# Patient Record
Sex: FEMALE | Race: WHITE | NOT HISPANIC OR LATINO | URBAN - METROPOLITAN AREA
[De-identification: names, ages, dates, MRNs, and addresses within clinical notes are randomized per-mention and may not be internally consistent; named-entity substitution may affect disease eponyms.]

---

## 2017-01-29 ENCOUNTER — OUTPATIENT (OUTPATIENT)
Dept: OUTPATIENT SERVICES | Facility: HOSPITAL | Age: 82
LOS: 1 days | End: 2017-01-29

## 2017-01-29 ENCOUNTER — INPATIENT (INPATIENT)
Facility: HOSPITAL | Age: 82
LOS: 4 days | Discharge: HOME HEALTH | End: 2017-02-03
Payer: MEDICARE

## 2017-01-29 PROCEDURE — 99285 EMERGENCY DEPT VISIT HI MDM: CPT

## 2017-01-29 PROCEDURE — 71020: CPT | Mod: 26

## 2017-01-30 ENCOUNTER — OUTPATIENT (OUTPATIENT)
Dept: OUTPATIENT SERVICES | Facility: HOSPITAL | Age: 82
LOS: 1 days | End: 2017-01-30

## 2017-01-30 PROCEDURE — 71250 CT THORAX DX C-: CPT | Mod: 26

## 2017-01-31 ENCOUNTER — OUTPATIENT (OUTPATIENT)
Dept: OUTPATIENT SERVICES | Facility: HOSPITAL | Age: 82
LOS: 1 days | End: 2017-01-31

## 2017-02-01 ENCOUNTER — OUTPATIENT (OUTPATIENT)
Dept: OUTPATIENT SERVICES | Facility: HOSPITAL | Age: 82
LOS: 1 days | End: 2017-02-01

## 2021-07-16 ENCOUNTER — EMERGENCY (EMERGENCY)
Facility: HOSPITAL | Age: 86
LOS: 1 days | End: 2021-07-16
Admitting: EMERGENCY MEDICINE
Payer: MEDICARE

## 2021-07-16 PROCEDURE — 93010 ELECTROCARDIOGRAM REPORT: CPT

## 2021-07-16 PROCEDURE — 99285 EMERGENCY DEPT VISIT HI MDM: CPT

## 2021-07-16 PROCEDURE — 71045 X-RAY EXAM CHEST 1 VIEW: CPT | Mod: 26

## 2021-09-10 ENCOUNTER — OUTPATIENT (OUTPATIENT)
Dept: OUTPATIENT SERVICES | Facility: HOSPITAL | Age: 86
LOS: 1 days | End: 2021-09-10
Payer: MEDICARE

## 2021-09-10 PROCEDURE — 93925 LOWER EXTREMITY STUDY: CPT | Mod: 26

## 2021-09-10 PROCEDURE — 93970 EXTREMITY STUDY: CPT | Mod: 26

## 2022-03-31 ENCOUNTER — APPOINTMENT (OUTPATIENT)
Dept: OPHTHALMOLOGY | Facility: CLINIC | Age: 87
End: 2022-03-31
Payer: MEDICARE

## 2022-03-31 ENCOUNTER — NON-APPOINTMENT (OUTPATIENT)
Age: 87
End: 2022-03-31

## 2022-03-31 PROCEDURE — 99213 OFFICE O/P EST LOW 20 MIN: CPT

## 2022-04-04 ENCOUNTER — APPOINTMENT (OUTPATIENT)
Dept: OPHTHALMOLOGY | Facility: CLINIC | Age: 87
End: 2022-04-04
Payer: MEDICARE

## 2022-04-04 ENCOUNTER — NON-APPOINTMENT (OUTPATIENT)
Age: 87
End: 2022-04-04

## 2022-04-04 PROCEDURE — 99213 OFFICE O/P EST LOW 20 MIN: CPT

## 2022-04-11 ENCOUNTER — APPOINTMENT (OUTPATIENT)
Dept: OPHTHALMOLOGY | Facility: CLINIC | Age: 87
End: 2022-04-11
Payer: MEDICARE

## 2022-04-11 ENCOUNTER — NON-APPOINTMENT (OUTPATIENT)
Age: 87
End: 2022-04-11

## 2022-04-11 PROCEDURE — 99213 OFFICE O/P EST LOW 20 MIN: CPT

## 2022-04-28 ENCOUNTER — NON-APPOINTMENT (OUTPATIENT)
Age: 87
End: 2022-04-28

## 2022-04-28 ENCOUNTER — APPOINTMENT (OUTPATIENT)
Dept: OPHTHALMOLOGY | Facility: CLINIC | Age: 87
End: 2022-04-28
Payer: MEDICARE

## 2022-04-28 PROCEDURE — 92134 CPTRZ OPH DX IMG PST SGM RTA: CPT

## 2022-04-28 PROCEDURE — 92014 COMPRE OPH EXAM EST PT 1/>: CPT

## 2022-05-05 ENCOUNTER — APPOINTMENT (OUTPATIENT)
Dept: ULTRASOUND IMAGING | Facility: CLINIC | Age: 87
End: 2022-05-05
Payer: MEDICARE

## 2022-05-05 PROCEDURE — 93970 EXTREMITY STUDY: CPT

## 2022-07-21 ENCOUNTER — APPOINTMENT (OUTPATIENT)
Dept: OPHTHALMOLOGY | Facility: CLINIC | Age: 87
End: 2022-07-21

## 2022-07-21 ENCOUNTER — NON-APPOINTMENT (OUTPATIENT)
Age: 87
End: 2022-07-21

## 2022-07-21 PROCEDURE — 99213 OFFICE O/P EST LOW 20 MIN: CPT

## 2022-08-03 ENCOUNTER — APPOINTMENT (OUTPATIENT)
Dept: OPHTHALMOLOGY | Facility: CLINIC | Age: 87
End: 2022-08-03

## 2022-08-03 ENCOUNTER — NON-APPOINTMENT (OUTPATIENT)
Age: 87
End: 2022-08-03

## 2022-08-03 PROCEDURE — 99213 OFFICE O/P EST LOW 20 MIN: CPT

## 2022-08-18 ENCOUNTER — APPOINTMENT (OUTPATIENT)
Dept: OPHTHALMOLOGY | Facility: CLINIC | Age: 87
End: 2022-08-18

## 2022-08-18 ENCOUNTER — NON-APPOINTMENT (OUTPATIENT)
Age: 87
End: 2022-08-18

## 2022-08-18 PROCEDURE — 92014 COMPRE OPH EXAM EST PT 1/>: CPT

## 2022-08-18 PROCEDURE — 92250 FUNDUS PHOTOGRAPHY W/I&R: CPT

## 2022-08-19 ENCOUNTER — NON-APPOINTMENT (OUTPATIENT)
Age: 87
End: 2022-08-19

## 2022-08-19 ENCOUNTER — APPOINTMENT (OUTPATIENT)
Dept: OPHTHALMOLOGY | Facility: CLINIC | Age: 87
End: 2022-08-19

## 2022-08-19 PROCEDURE — 92133 CPTRZD OPH DX IMG PST SGM ON: CPT

## 2022-08-19 PROCEDURE — 92083 EXTENDED VISUAL FIELD XM: CPT

## 2022-08-25 ENCOUNTER — APPOINTMENT (OUTPATIENT)
Dept: OPHTHALMOLOGY | Facility: CLINIC | Age: 87
End: 2022-08-25

## 2022-08-25 ENCOUNTER — NON-APPOINTMENT (OUTPATIENT)
Age: 87
End: 2022-08-25

## 2022-08-25 PROCEDURE — 99213 OFFICE O/P EST LOW 20 MIN: CPT

## 2022-08-29 ENCOUNTER — APPOINTMENT (OUTPATIENT)
Dept: OPHTHALMOLOGY | Facility: CLINIC | Age: 87
End: 2022-08-29

## 2022-08-29 ENCOUNTER — NON-APPOINTMENT (OUTPATIENT)
Age: 87
End: 2022-08-29

## 2022-08-29 PROCEDURE — 99213 OFFICE O/P EST LOW 20 MIN: CPT

## 2022-09-01 ENCOUNTER — NON-APPOINTMENT (OUTPATIENT)
Age: 87
End: 2022-09-01

## 2022-09-01 ENCOUNTER — APPOINTMENT (OUTPATIENT)
Dept: OPHTHALMOLOGY | Facility: CLINIC | Age: 87
End: 2022-09-01

## 2022-09-01 PROCEDURE — 99213 OFFICE O/P EST LOW 20 MIN: CPT

## 2022-09-12 ENCOUNTER — NON-APPOINTMENT (OUTPATIENT)
Age: 87
End: 2022-09-12

## 2022-09-12 ENCOUNTER — APPOINTMENT (OUTPATIENT)
Dept: OPHTHALMOLOGY | Facility: CLINIC | Age: 87
End: 2022-09-12

## 2022-09-12 PROCEDURE — 92012 INTRM OPH EXAM EST PATIENT: CPT

## 2022-10-13 ENCOUNTER — APPOINTMENT (OUTPATIENT)
Dept: DERMATOLOGY | Facility: CLINIC | Age: 87
End: 2022-10-13

## 2022-10-13 PROCEDURE — 99203 OFFICE O/P NEW LOW 30 MIN: CPT

## 2022-10-31 ENCOUNTER — APPOINTMENT (OUTPATIENT)
Dept: OPHTHALMOLOGY | Facility: CLINIC | Age: 87
End: 2022-10-31

## 2022-10-31 ENCOUNTER — NON-APPOINTMENT (OUTPATIENT)
Age: 87
End: 2022-10-31

## 2022-10-31 PROCEDURE — 92012 INTRM OPH EXAM EST PATIENT: CPT

## 2023-02-23 ENCOUNTER — EMERGENCY (EMERGENCY)
Facility: HOSPITAL | Age: 88
LOS: 1 days | Discharge: HOME | End: 2023-02-23
Attending: EMERGENCY MEDICINE | Admitting: EMERGENCY MEDICINE
Payer: MEDICARE

## 2023-02-23 VITALS
OXYGEN SATURATION: 99 % | DIASTOLIC BLOOD PRESSURE: 69 MMHG | HEART RATE: 70 BPM | RESPIRATION RATE: 15 BRPM | SYSTOLIC BLOOD PRESSURE: 160 MMHG

## 2023-02-23 VITALS
HEIGHT: 62 IN | SYSTOLIC BLOOD PRESSURE: 202 MMHG | HEART RATE: 66 BPM | DIASTOLIC BLOOD PRESSURE: 74 MMHG | OXYGEN SATURATION: 96 % | TEMPERATURE: 98 F | WEIGHT: 110.01 LBS | RESPIRATION RATE: 16 BRPM

## 2023-02-23 LAB
ALBUMIN SERPL ELPH-MCNC: 3.1 G/DL — LOW (ref 3.3–5)
ALP SERPL-CCNC: 64 U/L — SIGNIFICANT CHANGE UP (ref 30–120)
ALT FLD-CCNC: 24 U/L DA — SIGNIFICANT CHANGE UP (ref 10–60)
ANION GAP SERPL CALC-SCNC: 6 MMOL/L — SIGNIFICANT CHANGE UP (ref 5–17)
APPEARANCE UR: CLEAR — SIGNIFICANT CHANGE UP
AST SERPL-CCNC: 22 U/L — SIGNIFICANT CHANGE UP (ref 10–40)
BASOPHILS # BLD AUTO: 0.04 K/UL — SIGNIFICANT CHANGE UP (ref 0–0.2)
BASOPHILS NFR BLD AUTO: 0.5 % — SIGNIFICANT CHANGE UP (ref 0–2)
BILIRUB SERPL-MCNC: 0.5 MG/DL — SIGNIFICANT CHANGE UP (ref 0.2–1.2)
BILIRUB UR-MCNC: NEGATIVE — SIGNIFICANT CHANGE UP
BUN SERPL-MCNC: 15 MG/DL — SIGNIFICANT CHANGE UP (ref 7–23)
CALCIUM SERPL-MCNC: 9.4 MG/DL — SIGNIFICANT CHANGE UP (ref 8.4–10.5)
CHLORIDE SERPL-SCNC: 108 MMOL/L — SIGNIFICANT CHANGE UP (ref 96–108)
CO2 SERPL-SCNC: 27 MMOL/L — SIGNIFICANT CHANGE UP (ref 22–31)
COLOR SPEC: YELLOW — SIGNIFICANT CHANGE UP
CREAT SERPL-MCNC: 0.83 MG/DL — SIGNIFICANT CHANGE UP (ref 0.5–1.3)
DIFF PNL FLD: NEGATIVE — SIGNIFICANT CHANGE UP
EGFR: 68 ML/MIN/1.73M2 — SIGNIFICANT CHANGE UP
EOSINOPHIL # BLD AUTO: 0.14 K/UL — SIGNIFICANT CHANGE UP (ref 0–0.5)
EOSINOPHIL NFR BLD AUTO: 1.9 % — SIGNIFICANT CHANGE UP (ref 0–6)
FLUAV AG NPH QL: SIGNIFICANT CHANGE UP
FLUBV AG NPH QL: SIGNIFICANT CHANGE UP
GLUCOSE SERPL-MCNC: 98 MG/DL — SIGNIFICANT CHANGE UP (ref 70–99)
GLUCOSE UR QL: NEGATIVE MG/DL — SIGNIFICANT CHANGE UP
HCT VFR BLD CALC: 38.1 % — SIGNIFICANT CHANGE UP (ref 34.5–45)
HGB BLD-MCNC: 12.2 G/DL — SIGNIFICANT CHANGE UP (ref 11.5–15.5)
IMM GRANULOCYTES NFR BLD AUTO: 0.4 % — SIGNIFICANT CHANGE UP (ref 0–0.9)
KETONES UR-MCNC: NEGATIVE — SIGNIFICANT CHANGE UP
LEUKOCYTE ESTERASE UR-ACNC: NEGATIVE — SIGNIFICANT CHANGE UP
LYMPHOCYTES # BLD AUTO: 1.61 K/UL — SIGNIFICANT CHANGE UP (ref 1–3.3)
LYMPHOCYTES # BLD AUTO: 21.7 % — SIGNIFICANT CHANGE UP (ref 13–44)
MCHC RBC-ENTMCNC: 32 GM/DL — SIGNIFICANT CHANGE UP (ref 32–36)
MCHC RBC-ENTMCNC: 32.1 PG — SIGNIFICANT CHANGE UP (ref 27–34)
MCV RBC AUTO: 100.3 FL — HIGH (ref 80–100)
MONOCYTES # BLD AUTO: 0.81 K/UL — SIGNIFICANT CHANGE UP (ref 0–0.9)
MONOCYTES NFR BLD AUTO: 10.9 % — SIGNIFICANT CHANGE UP (ref 2–14)
NEUTROPHILS # BLD AUTO: 4.8 K/UL — SIGNIFICANT CHANGE UP (ref 1.8–7.4)
NEUTROPHILS NFR BLD AUTO: 64.6 % — SIGNIFICANT CHANGE UP (ref 43–77)
NITRITE UR-MCNC: NEGATIVE — SIGNIFICANT CHANGE UP
NRBC # BLD: 0 /100 WBCS — SIGNIFICANT CHANGE UP (ref 0–0)
PH UR: 6 — SIGNIFICANT CHANGE UP (ref 5–8)
PLATELET # BLD AUTO: 255 K/UL — SIGNIFICANT CHANGE UP (ref 150–400)
POTASSIUM SERPL-MCNC: 5.7 MMOL/L — HIGH (ref 3.5–5.3)
POTASSIUM SERPL-SCNC: 5.7 MMOL/L — HIGH (ref 3.5–5.3)
PROT SERPL-MCNC: 7 G/DL — SIGNIFICANT CHANGE UP (ref 6–8.3)
PROT UR-MCNC: NEGATIVE MG/DL — SIGNIFICANT CHANGE UP
RBC # BLD: 3.8 M/UL — SIGNIFICANT CHANGE UP (ref 3.8–5.2)
RBC # FLD: 13.1 % — SIGNIFICANT CHANGE UP (ref 10.3–14.5)
RSV RNA NPH QL NAA+NON-PROBE: SIGNIFICANT CHANGE UP
SARS-COV-2 RNA SPEC QL NAA+PROBE: SIGNIFICANT CHANGE UP
SODIUM SERPL-SCNC: 141 MMOL/L — SIGNIFICANT CHANGE UP (ref 135–145)
SP GR SPEC: 1.01 — SIGNIFICANT CHANGE UP (ref 1.01–1.02)
TROPONIN I, HIGH SENSITIVITY RESULT: 8.9 NG/L — SIGNIFICANT CHANGE UP
UROBILINOGEN FLD QL: NEGATIVE MG/DL — SIGNIFICANT CHANGE UP
WBC # BLD: 7.43 K/UL — SIGNIFICANT CHANGE UP (ref 3.8–10.5)
WBC # FLD AUTO: 7.43 K/UL — SIGNIFICANT CHANGE UP (ref 3.8–10.5)

## 2023-02-23 PROCEDURE — 71045 X-RAY EXAM CHEST 1 VIEW: CPT | Mod: 26

## 2023-02-23 PROCEDURE — 70450 CT HEAD/BRAIN W/O DYE: CPT | Mod: MA

## 2023-02-23 PROCEDURE — 81003 URINALYSIS AUTO W/O SCOPE: CPT

## 2023-02-23 PROCEDURE — 80053 COMPREHEN METABOLIC PANEL: CPT

## 2023-02-23 PROCEDURE — 87637 SARSCOV2&INF A&B&RSV AMP PRB: CPT

## 2023-02-23 PROCEDURE — 93005 ELECTROCARDIOGRAM TRACING: CPT

## 2023-02-23 PROCEDURE — 99285 EMERGENCY DEPT VISIT HI MDM: CPT | Mod: FS

## 2023-02-23 PROCEDURE — 85025 COMPLETE CBC W/AUTO DIFF WBC: CPT

## 2023-02-23 PROCEDURE — 70450 CT HEAD/BRAIN W/O DYE: CPT | Mod: 26,MA

## 2023-02-23 PROCEDURE — 93010 ELECTROCARDIOGRAM REPORT: CPT

## 2023-02-23 PROCEDURE — 99285 EMERGENCY DEPT VISIT HI MDM: CPT | Mod: 25

## 2023-02-23 PROCEDURE — 84484 ASSAY OF TROPONIN QUANT: CPT

## 2023-02-23 PROCEDURE — 36415 COLL VENOUS BLD VENIPUNCTURE: CPT

## 2023-02-23 PROCEDURE — 71045 X-RAY EXAM CHEST 1 VIEW: CPT

## 2023-02-23 PROCEDURE — 96360 HYDRATION IV INFUSION INIT: CPT

## 2023-02-23 RX ORDER — ASPIRIN/CALCIUM CARB/MAGNESIUM 324 MG
1 TABLET ORAL
Qty: 0 | Refills: 0 | DISCHARGE

## 2023-02-23 RX ORDER — METOPROLOL TARTRATE 50 MG
1 TABLET ORAL
Qty: 0 | Refills: 0 | DISCHARGE

## 2023-02-23 RX ORDER — SODIUM CHLORIDE 9 MG/ML
1000 INJECTION INTRAMUSCULAR; INTRAVENOUS; SUBCUTANEOUS ONCE
Refills: 0 | Status: COMPLETED | OUTPATIENT
Start: 2023-02-23 | End: 2023-02-23

## 2023-02-23 RX ORDER — SPIRONOLACTONE 25 MG/1
0 TABLET, FILM COATED ORAL
Qty: 0 | Refills: 0 | DISCHARGE

## 2023-02-23 RX ADMIN — SODIUM CHLORIDE 1000 MILLILITER(S): 9 INJECTION INTRAMUSCULAR; INTRAVENOUS; SUBCUTANEOUS at 13:14

## 2023-02-23 RX ADMIN — SODIUM CHLORIDE 1000 MILLILITER(S): 9 INJECTION INTRAMUSCULAR; INTRAVENOUS; SUBCUTANEOUS at 14:14

## 2023-02-23 NOTE — CONSULT NOTE ADULT - SUBJECTIVE AND OBJECTIVE BOX
Patient is a 87y old  Female who presents with a chief complaint of Syncope 2 weeks ago    HPI:  87y old  Female with h/o HTN was sent by her PMD Dr. Wright. Pt had a syncopal episode 2 weeks ago. In PMD's office her BP was what elevated.  In ED her BP was 202/74  Pt denies any complaints now.  No Facial asymmetry.  No lateralized weakness.  No tongue bite.  No C/o HA, N/V.  No c/o blurry or double vision.  No ataxia or incoordination.    PAST MEDICAL & SURGICAL HISTORY:    No pertinent past medical history    Home Medications:    Aspir 81 oral delayed release tablet: 1 tab(s) orally once a day (2023 12:41)  metoprolol tartrate 25 mg oral tablet: 1 tab(s) orally 2 times a day (2023 12:41)  spironolactone: orally once a day (2023 12:41)    Allergies    No Known Allergies    Intolerances    opioid-like analgesics (Vomiting)    SOCIAL HISTORY:    No h/o alcohol use.  Ex Smoker. Quite in past.    FAMILY HISTORY: N/C as per chart    REVIEW OF SYSTEMS:    CONSTITUTIONAL: No fever  EYES: No eye pain,   ENMT:  No sinus or throat pain  NECK: No pain or stiffness  RESPIRATORY: No cough, No hemoptysis; No shortness of breath  CARDIOVASCULAR: No acute chest pain, palpitations,  H/o HTN  GASTROINTESTINAL: No abdominal pain. No nausea, vomiting,   GENITOURINARY: No  hematuria, or incontinence  MUSCULOSKELETAL: No joint swelling; No extremity pain  SKIN: No itching, rashes, or lesions   LYMPH NODES: No enlarged glands  NEUROLOGICAL: No headaches, memory loss,   PSYCHIATRIC: No depression, anxiety, mood swings, or difficulty sleeping  ENDOCRINE: No heat or cold intolerance;   HEME/LYMPH: No easy bruising, or bleeding gums  Allergy/Immunology. No medication allergy. No seasonal allergies.    PHYSICAL EXAM:  Vital Signs Last 24 Hrs  T(F): 97.8 (23 @ 13:10)  HR: 62 (23 @ 13:10)  BP: 202/74 (23 @ 12:35)  RR: 17 (23 @ 13:10)    GENERAL: NAD, well-groomed, well-developed  HEAD:  Atraumatic, Normocephalic  EYES: EOMI, PERRLA, conjunctiva and sclera clear  NECK: Supple, No JVD, thyroid non-palpable    On Neurological Examination:    Mental Status - Pt is alert, awake, oriented X3. Higher functions are intact.  Follows commands well and able to answer questions appropriately.    Speech -  Normal. Pt has no aphasia.    Cranial Nerves - Pupils 3 mm equal and reactive to light, extraocular eye movements intact. Pt has no visual field deficit.  No facial asymmetry. Tongue - is in midline.    Motor Exam - 4 plus/5 all over, No drift. No shaking or tremors.     Sensory Exam -  Pt withdraws all extremities equally on stimulation. No asymmetry seen. No complaints of tingling, numbness.    Gait - Able to stand and walk unassisted.     Deep tendon Reflexes - 2 plus all over.    Coordination - Fine finger movements and Finger to nose are normal on both sides.       Romberg - Negative.    Neck Supple -  Yes.    LABS:                        12.2   7.43  )-----------( 255      ( 2023 13:08 )             38.1         141  |  108  |  15  ----------------------------<  98  5.7<H>   |  27  |  0.83    Ca    9.4      2023 13:08    Urinalysis Basic - ( 2023 12:45 )    Color: Yellow / Appearance: Clear / S.010 / pH: x  Gluc: x / Ketone: Negative  / Bili: Negative / Urobili: Negative mg/dL   Blood: x / Protein: Negative mg/dL / Nitrite: Negative   Leuk Esterase: Negative / RBC: x / WBC x   Sq Epi: x / Non Sq Epi: x / Bacteria: x    RADIOLOGY & ADDITIONAL STUDIES:    CT Head is pending.   Patient is a 87y old  Female who presents with a chief complaint of Syncope 2 weeks ago    HPI:  87y old  Female with h/o HTN was sent by her PMD Dr. Wright. Pt had a syncopal episode 2 weeks ago. In PMD's office her BP was what elevated.  In ED her BP was 202/74  Pt denies any complaints now.  No Facial asymmetry.  No lateralized weakness.  No tongue bite.  No C/o HA, N/V.  No c/o blurry or double vision.  No ataxia or incoordination.    PAST MEDICAL & SURGICAL HISTORY:    No pertinent past medical history    Home Medications:    Aspir 81 oral delayed release tablet: 1 tab(s) orally once a day (2023 12:41)  metoprolol tartrate 25 mg oral tablet: 1 tab(s) orally 2 times a day (2023 12:41)  spironolactone: orally once a day (2023 12:41)    Allergies    No Known Allergies    Intolerances    opioid-like analgesics (Vomiting)    SOCIAL HISTORY:    No h/o alcohol use.  Ex Smoker. Quite in past.    FAMILY HISTORY: N/C as per chart    REVIEW OF SYSTEMS:    CONSTITUTIONAL: No fever  EYES: No eye pain,   ENMT:  No sinus or throat pain  NECK: No pain or stiffness  RESPIRATORY: No cough, No hemoptysis; No shortness of breath  CARDIOVASCULAR: No acute chest pain, palpitations,  H/o HTN  GASTROINTESTINAL: No abdominal pain. No nausea, vomiting,   GENITOURINARY: No  hematuria, or incontinence  MUSCULOSKELETAL: No joint swelling; No extremity pain  SKIN: No itching, rashes, or lesions   LYMPH NODES: No enlarged glands  NEUROLOGICAL: No headaches, memory loss,   PSYCHIATRIC: No depression, anxiety, mood swings, or difficulty sleeping  ENDOCRINE: No heat or cold intolerance;   HEME/LYMPH: No easy bruising, or bleeding gums  Allergy/Immunology. No medication allergy. No seasonal allergies.    PHYSICAL EXAM:  Vital Signs Last 24 Hrs  T(F): 97.8 (23 @ 13:10)  HR: 62 (23 @ 13:10)  BP: 202/74 (23 @ 12:35)  RR: 17 (23 @ 13:10)    GENERAL: NAD, well-groomed, well-developed  HEAD:  Atraumatic, Normocephalic  EYES: EOMI, PERRLA, conjunctiva and sclera clear  NECK: Supple, No JVD    On Neurological Examination:    Mental Status - Pt is alert, awake, oriented X3. Higher functions are intact.  Follows commands well and able to answer questions appropriately.    Speech -  Normal. Pt has no aphasia.    Cranial Nerves - Pupils 3 mm equal and reactive to light, extraocular eye movements intact. Pt has no visual field deficit.  No facial asymmetry. Tongue - is in midline.    Motor Exam - 4 plus/5 all over, No drift. No shaking or tremors.     Sensory Exam -  Pt withdraws all extremities equally on stimulation. No asymmetry seen. No complaints of tingling, numbness.    Gait - Able to stand and walk unassisted.     Deep tendon Reflexes - 2 plus all over.    Coordination - Fine finger movements and Finger to nose are normal on both sides.       Romberg - Negative.    Neck Supple -  Yes.    LABS:                        12.2   7.43  )-----------( 255      ( 2023 13:08 )             38.1         141  |  108  |  15  ----------------------------<  98  5.7<H>   |  27  |  0.83    Ca    9.4      2023 13:08    Urinalysis Basic - ( 2023 12:45 )    Color: Yellow / Appearance: Clear / S.010 / pH: x  Gluc: x / Ketone: Negative  / Bili: Negative / Urobili: Negative mg/dL   Blood: x / Protein: Negative mg/dL / Nitrite: Negative   Leuk Esterase: Negative / RBC: x / WBC x   Sq Epi: x / Non Sq Epi: x / Bacteria: x    RADIOLOGY & ADDITIONAL STUDIES:    CT Head is pending.

## 2023-02-23 NOTE — ED PROVIDER NOTE - CLINICAL SUMMARY MEDICAL DECISION MAKING FREE TEXT BOX
87 F hx paroxysmal afib, copd sent from Dr. Wright's office due to fatigue, weakness for the past few weeks after syncopal episode that occurred after an exercise class. Denies head injury at that time. Did not want to go to hospital for evaluation when it happened, but did follow up with a cardiologist, had evaluation including a monitor for a few days, states normal workup. Since then has been having less energy than usual, periods of generalized weakness. Had labs done at outside urgent care. Followed up with Dr. Wright today and while in office was not feeling well, had elevated BP. Was referred to ED for further evaluation.    VSS Afebrile, NAD, /70. Nonorthostatic.  HEENT - clear  PERRL EOMI  Neck supple  lungs clear  Cor S1S2 RR - MGR  Abd soft nontender, no mass or HSM, no rebound  Ext FROM intact, no edema  Neuro A&Ox3, CN 2-12 Intact, no sensory motor deficits. Gait normal. DTRs +2  Skin Warm and dry no rash.    Imp-Syncope 3 weeks ago with intermittent weakness since and fluctuations in blood pressure.  Already had outpatient cardiology evaluation that was unrevealing.  Plan is we will repeat labs EKG and consider CT brain and neurology evaluation.

## 2023-02-23 NOTE — ED PROVIDER NOTE - PATIENT PORTAL LINK FT
You can access the FollowMyHealth Patient Portal offered by [facility] by registering at the following website: [email or web address]. By joining [facility]’s FollowMyHealth portal, you will also be able to view your health information using other applications (apps) compatible with our system.

## 2023-02-23 NOTE — ED ADULT NURSE NOTE - OBJECTIVE STATEMENT
Patient BIBEMS from MDs office for syncopal episode. As per patient: "I passed out 2 weeks ago while doing an exercise class and now my doctor wants me to be checked out." Patient denies any pain or discomfort at present. Was able to ambulate to the restroom with a steady gait. Denies any chest pain, shortness of breath or headache.

## 2023-02-23 NOTE — ED PROVIDER NOTE - CARE PROVIDER_API CALL
Alli Wright)  Internal Medicine  [address] [place] [address]  Phone: [phone]  Fax: [phone]  Follow Up Time:

## 2023-02-23 NOTE — ED ADULT TRIAGE NOTE - CHIEF COMPLAINT QUOTE
weakness pe rpt. passed out 2 weeks ago and seen at urgent care and afterwards at cardio dr blanka villarreal and had loop recorder normal and thinks echo anfd normal per pt

## 2023-02-23 NOTE — ED PROVIDER NOTE - OBJECTIVE STATEMENT
87 F hx paroxysmal afib, copd sent from Dr. Wright's office due to fatigue, weakness for the past few weeks after syncopal episode that occurred after an exercise class. Denies head injury at that time. Did not want to go to hospital for evaluation when it happened, but did follow up with a cardiologist, had evaluation including a monitor for a few days, states normal workup. Since then has been having less energy than usual, periods of generalized weakness. Had labs done at outside urgent care. Followed up with Dr. Wright today and while in office was not feeling well, had elevated BP. Was referred to ED for further evaluation.

## 2023-02-23 NOTE — ED PROVIDER NOTE - PRO INTERPRETER NEED 2
Kitty 81  EP Procedure Sheet    2/22/23  Heidi Marsh  1957  EP Procedures  [] Pacemaker implant (single/dual) [] EP Study   [] ICD implant (single/dual) [] Atrial flutter ablation (KRISTIN Y/N)   [] Biv implant ICD [] Tilt Table   [] Biv implant PPM [] Atrial fibrillation ablation (KRISTIN Yes)   [] Generator Change (PPM/ICD/BiV) [] SVT ablation   [] Lead revision (RV/LA/RA) (<1 month) [] PVC ablation     [] Lead extraction +/- upgrade (BiV/PPM/ICD) [] VT Ischemic/ non-ischemic   [] Loop implant/ removal [] VT RVOT   [x] Cardioversion [] VT Left sided   [] KRISTIN [] AVN ablation   Equipment  [] Medtronic  [] HERRERA Mapping System   [] [place] [] [address]   [] [employer] [] CryoAblation   [] Biotronik [] Laser Lead Extraction   EP Procedures Scheduling Request  # hours Requested  []1 []2 []2-4 [] 4-6 Scheduled  Date:   Specific Day 4 weeks Completed    Anesthesia []yes []no F/u Date:   CT surgery backup []yes []no     Overnight stay      Performing MD [x]RMM []MXA   []MKW [] CMV First vs repeat   []1st [] 2nd [] 3rd   Pre-Procedure Labs / Imaging  [] PT/INR [] Type & cross   [] CBC [] Units PRBC   [] BMP/Mg [] Units FFP   [] Venogram [] Cardiac CTA for Pulmonary vein mapping     RN INITIALS: SR    Patient Instructions  Do not eat or drink after midnight the night prior to procedure  Dx:Persistent AF  ICD-10 code: I48.19 English

## 2023-02-23 NOTE — ED PROVIDER NOTE - NSFOLLOWUPINSTRUCTIONS_ED_ALL_ED_FT
Follow up with Dr. Wright next week.  Return for worsening or concerning symptoms.      If you have fatigue, you feel tired all the time and have a lack of energy or a lack of motivation. Fatigue may make it difficult to start or complete tasks because of exhaustion.    Occasional or mild fatigue is often a normal response to activity or life. However, long-term (chronic) or extreme fatigue may be a symptom of a medical condition such as:  •Depression.      •Not having enough red blood cells or hemoglobin in the blood (anemia).      •A problem with a small gland located in the lower front part of the neck (thyroid disorder).      •Rheumatologic conditions. These are problems related to the body's defense system (immune system).      •Infections, especially certain viral infections.      Fatigue can also lead to negative health outcomes over time.      Follow these instructions at home:    Medicines     •Take over-the-counter and prescription medicines only as told by your health care provider.      •Take a multivitamin if told by your health care provider.      • Do not use herbal or dietary supplements unless they are approved by your health care provider.        Eating and drinking   A comparison of three sample cups showing dark yellow, yellow, and pale yellow urine.   •Avoid heavy meals in the evening.      •Eat a well-balanced diet, which includes lean proteins, whole grains, plenty of fruits and vegetables, and low-fat dairy products.      •Avoid eating or drinking too many products with caffeine in them.      •Avoid alcohol.      •Drink enough fluid to keep your urine pale yellow.        Activity   A person sitting on the floor doing yoga.   •Exercise regularly, as told by your health care provider.      •Use or practice techniques to help you relax, such as yoga, edwardo chi, meditation, or massage therapy.      Lifestyle     •Change situations that cause you stress. Try to keep your work and personal schedules in balance.      • Do not use recreational or illegal drugs.      General instructions     •Monitor your fatigue for any changes.      •Go to bed and get up at the same time every day.      •Avoid fatigue by pacing yourself during the day and getting enough sleep at night.      •Maintain a healthy weight.        Contact a health care provider if:    •Your fatigue does not get better.      •You have a fever.      •You suddenly lose or gain weight.      •You have headaches.      •You have trouble falling asleep or sleeping through the night.      •You feel angry, guilty, anxious, or sad.      •You have swelling in your legs or another part of your body.        Get help right away if:    •You feel confused, feel like you might faint, or faint.      •Your vision is blurry or you have a severe headache.      •You have severe pain in your abdomen, your back, or the area between your waist and hips (pelvis).      •You have chest pain, shortness of breath, or an irregular or fast heartbeat.      •You are unable to urinate, or you urinate less than normal.      •You have abnormal bleeding from the rectum, nose, lungs, nipples, or, if you are female, the vagina.      •You vomit blood.      •You have thoughts about hurting yourself or others.      These symptoms may be an emergency. Get help right away. Call 911.   • Do not wait to see if the symptoms will go away.       • Do not drive yourself to the hospital.       Get help right away if you feel like you may hurt yourself or others, or have thoughts about taking your own life. Go to your nearest emergency room or:   • Call 911.       • Call the National Suicide Prevention Lifeline at 1-503.330.9678 or 374. This is open 24 hours a day.       • Text the Crisis Text Line at 758189.         Summary    •If you have fatigue, you feel tired all the time and have a lack of energy or a lack of motivation.      •Fatigue may make it difficult to start or complete tasks because of exhaustion.      •Long-term (chronic) or extreme fatigue may be a symptom of a medical condition.      •Exercise regularly, as told by your health care provider.      •Change situations that cause you stress. Try to keep your work and personal schedules in balance.      This information is not intended to replace advice given to you by your health care provider. Make sure you discuss any questions you have with your health care provider.

## 2023-02-23 NOTE — CONSULT NOTE ADULT - ASSESSMENT
Pt is seen for Syncope/Positive LOC/No LOC.  Found on Floor.  No seizure activity is reported.  Rectal temp was degree F.  PNA/UTI?  No signs of meningitis.  Limited but non focal exam.  CT Head - No acute pathology.  No signs of CVA.  Pt is Dehydrated with BUN/Cr -   Rhabdomyolysis. CPK -   Symptoms likely secondary to vasovagal/Orthostatic Hypotension.  D/D Include cardiac arrhythmia/Valvular dysfunction.  D/w ED physician, .   D/w covering nurse.  D/w   at bedside. Questions answered.  Pt is given info to follow up with neurologist Rubi Hernandez [phone] for out pt MRI Brain pre/post with gado.   Would continue to follow.     Pt is seen for Syncope 2 weeks ago  No seizure activity is reported.  Non focal exam.  CT Head - Pending  No signs of CVA.  Elevated /74  Hypertensive urgency  Cardiology eval.  BP management.  2D Echo if not done recently.   Stable neurologically.  D/w ED physician, Dr. Miranda  D/w pt at bedside. Questions answered.  Would continue to follow, if admitted.

## 2023-02-23 NOTE — ED PROVIDER NOTE - NS ED ATTENDING STATEMENT MOD
This was a shared visit with the SENG. I reviewed and verified the documentation and independently performed the documented:

## 2023-02-24 PROBLEM — Z00.00 ENCOUNTER FOR PREVENTIVE HEALTH EXAMINATION: Status: ACTIVE | Noted: 2023-02-24

## 2023-03-06 ENCOUNTER — NON-APPOINTMENT (OUTPATIENT)
Age: 88
End: 2023-03-06

## 2023-03-06 ENCOUNTER — APPOINTMENT (OUTPATIENT)
Dept: ELECTROPHYSIOLOGY | Facility: CLINIC | Age: 88
End: 2023-03-06
Payer: MEDICARE

## 2023-03-06 VITALS
BODY MASS INDEX: 19.88 KG/M2 | SYSTOLIC BLOOD PRESSURE: 130 MMHG | WEIGHT: 108 LBS | OXYGEN SATURATION: 100 % | DIASTOLIC BLOOD PRESSURE: 90 MMHG | HEIGHT: 62 IN | HEART RATE: 64 BPM

## 2023-03-06 DIAGNOSIS — Z78.9 OTHER SPECIFIED HEALTH STATUS: ICD-10-CM

## 2023-03-06 DIAGNOSIS — Z87.891 PERSONAL HISTORY OF NICOTINE DEPENDENCE: ICD-10-CM

## 2023-03-06 DIAGNOSIS — I10 ESSENTIAL (PRIMARY) HYPERTENSION: ICD-10-CM

## 2023-03-06 PROCEDURE — 93000 ELECTROCARDIOGRAM COMPLETE: CPT

## 2023-03-06 PROCEDURE — 99214 OFFICE O/P EST MOD 30 MIN: CPT

## 2023-03-06 RX ORDER — RIVAROXABAN 15 MG/1
15 TABLET, FILM COATED ORAL
Qty: 30 | Refills: 0 | Status: ACTIVE | COMMUNITY
Start: 2023-02-25

## 2023-03-06 RX ORDER — METOPROLOL SUCCINATE 25 MG/1
25 TABLET, EXTENDED RELEASE ORAL TWICE DAILY
Refills: 0 | Status: ACTIVE | COMMUNITY
Start: 2022-12-03

## 2023-03-06 RX ORDER — SPIRONOLACTONE 25 MG/1
25 TABLET ORAL DAILY
Qty: 90 | Refills: 3 | Status: ACTIVE | COMMUNITY
Start: 2023-02-28

## 2023-03-06 NOTE — DISCUSSION/SUMMARY
[FreeTextEntry1] : This is a patient had prior paroxysmal atrial fibrillation who had a recent episode of A-fib with RVR and symptoms of shortness of breath and fatigue.  She self converted.  She was initiated on amiodarone and was discharged home on 200 mg/day.  She complains of mild fatigue but no shortness of breath chest pain or other symptoms of dizziness lightheadedness.  She is also on low-dose metoprolol.  Patient is on anticoagulation with Xarelto and has not missed dosage and has not had any bleeding issues\par \par The recommended we decrease amiodarone to 100 mg/day and see how she does.  The patient understands and will be splitting her dosage.  We should obtain follow-up monitoring on his lower dosage.  She will follow-up with her cardiologist Dr. Bryan Spence and we will discussed findings of repeat monitoring when done.  Patient was given option to see me in follow-up in 5 months.  Amiodarone side effect profile including black box warning was reviewed again.  Patient instructed if she has recurrent A-fib to resume amiodarone 200 mg/day and notify us. [EKG obtained to assist in diagnosis and management of assessed problem(s)] : EKG obtained to assist in diagnosis and management of assessed problem(s)

## 2023-03-06 NOTE — PHYSICAL EXAM
[No Acute Distress] : no acute distress [Normal Conjunctiva] : normal conjunctiva [Normal Venous Pressure] : normal venous pressure [Normal S1, S2] : normal S1, S2 [No Murmur] : no murmur [No Rub] : no rub [Good Air Entry] : good air entry [Non Tender] : non-tender [Normal Gait] : normal gait [No Edema] : no edema [No Cyanosis] : no cyanosis [No Clubbing] : no clubbing [Alert and Oriented] : alert and oriented

## 2023-03-06 NOTE — REVIEW OF SYSTEMS
[Feeling Fatigued] : feeling fatigued [Weight Loss (___ Lbs)] : no recent weight loss [Blurry Vision] : no blurred vision [Sore Throat] : no sore throat [SOB] : no shortness of breath [Cough] : no cough [Abdominal Pain] : no abdominal pain [Dizziness] : no dizziness

## 2023-03-06 NOTE — HISTORY OF PRESENT ILLNESS
[FreeTextEntry1] : Cardiologist:  Bryan Spence MD\par \par The patient is an 87-year-old woman who is seen in follow-up from the hospital where she presented with atrial fibrillation.  She had symptoms of shortness of breath and fatigue.  She is self converted.  She was discharged home on amiodarone.\par \par \par \par She has a prior history of hypertension, COPD.  She was diagnosed with atrial fibrillation about 10 years ago.\par \par The patient had presented to [facility] emergency room with complaints of fatigue and shortness of breath and was found to be in A-fib with RVR.  She had self converted to sinus rhythm with sinus bradycardia.  The patient was taken to oral Toprol 25 mg half a dose in the morning and p.m.  Because of her atrial fibrillation she was started on amiodarone.\par \par In follow-up the patient has been feeling well and does not describe any recurrence of A-fib.  She is currently on amiodarone 200 mg/day metoprolol 25 mg half tablet twice daily spironolactone and Xarelto\par \par She had a monitor placed 2/17/2023 that showed predominantly sinus rhythm with short episodes of atrial tachycardia at 137 bpm.\par \par EKG from [facility] from February 22, 2023 was reviewed that showed A-fib with RVR.\par \par She had an echocardiogram performed at [facility] on 2/24/2023 that showed EF 65%, mild mitral regurgitation, mild tricuspid regurgitation, left atrial volume index 35 cc per metered square.\par \par She had thyroid function test done on 2/10/2023 that showed normal TSH.\par \par The patient had a prior evaluation 2/23/2023 for syncope episode.  She fainted right after exercise class in 2/2/2023.\par \par She has a prior history of hypertension, COPD and paroxysmal atrial fibrillation.\par \par

## 2023-03-16 ENCOUNTER — APPOINTMENT (OUTPATIENT)
Dept: OPHTHALMOLOGY | Facility: CLINIC | Age: 88
End: 2023-03-16
Payer: MEDICARE

## 2023-03-16 ENCOUNTER — NON-APPOINTMENT (OUTPATIENT)
Age: 88
End: 2023-03-16

## 2023-03-16 PROCEDURE — 92134 CPTRZ OPH DX IMG PST SGM RTA: CPT

## 2023-03-16 PROCEDURE — 99213 OFFICE O/P EST LOW 20 MIN: CPT

## 2023-03-30 ENCOUNTER — NON-APPOINTMENT (OUTPATIENT)
Age: 88
End: 2023-03-30

## 2023-08-07 ENCOUNTER — APPOINTMENT (OUTPATIENT)
Dept: ELECTROPHYSIOLOGY | Facility: CLINIC | Age: 88
End: 2023-08-07
Payer: MEDICARE

## 2023-08-07 VITALS
SYSTOLIC BLOOD PRESSURE: 140 MMHG | OXYGEN SATURATION: 100 % | HEIGHT: 62 IN | DIASTOLIC BLOOD PRESSURE: 70 MMHG | WEIGHT: 108 LBS | BODY MASS INDEX: 19.88 KG/M2 | HEART RATE: 52 BPM

## 2023-08-07 DIAGNOSIS — I48.0 PAROXYSMAL ATRIAL FIBRILLATION: ICD-10-CM

## 2023-08-07 DIAGNOSIS — J44.9 CHRONIC OBSTRUCTIVE PULMONARY DISEASE, UNSPECIFIED: ICD-10-CM

## 2023-08-07 PROCEDURE — 99214 OFFICE O/P EST MOD 30 MIN: CPT

## 2023-08-07 PROCEDURE — 93000 ELECTROCARDIOGRAM COMPLETE: CPT

## 2023-08-07 NOTE — DISCUSSION/SUMMARY
[EKG obtained to assist in diagnosis and management of assessed problem(s)] : EKG obtained to assist in diagnosis and management of assessed problem(s) [FreeTextEntry1] : She seems to be doing well without any recurrence of A-fib.  She does have recent emergence of very transient dizziness.  She has baseline COPD and cough.  I would advise her to follow-up with her pulmonologist regarding this and to obtain pulmonary function testing as well.  We will get follow-up liver function as well as thyroid function testing.  We will also get a follow-up monitor to see if these dizzy episodes correspond to any bradycardia.  For now we will continue her on amiodarone 100 mg/day and Xarelto.  She is also on metoprolol 25 mg/day half a tablet daily which we will continue.  The patient is seeing her cardiologist Dr. Bryan Solis shortly and she will get follow-up testing including a monitor with him.  I will forward a copy of my note to him.  The

## 2023-08-07 NOTE — CARDIOLOGY SUMMARY
[de-identified] : EKG 8/7/2020 shows sinus rhythm with low voltage ME 0.16 QRS 0.08 QT 0.48 mild left axis

## 2023-08-07 NOTE — HISTORY OF PRESENT ILLNESS
[FreeTextEntry1] : Cardiologist:  Bryan Spence MD  Patient is an 87-year-old woman who is seen in follow-up evaluation regarding her prior atrial fibrillation.  As noted previously she was seen at [facility] back in March 2023 for A-fib and she was started on amiodarone.  She was seen in the office March 2023 at which time her amiodarone dosage was decreased from 200 mg a day to 100 mg/day.  Since then the patient has been feeling well without any recurrence of atrial fibrillation.  She has 200 mg pills that she pranks.  She seems to be tolerating amiodarone well.  She has had some recent intermittent dizziness that she does not consider serious.  No chest pain or shortness of breath.  No palpitations.  She has an occasional cough which she has had for a long time.  She does not sleep very well at this time.  She has a prior history of hypertension, COPD.  She was diagnosed with atrial fibrillation about 10 years ago.  Previous history: The patient had presented to [facility] emergency room with complaints of fatigue and shortness of breath and was found to be in A-fib with RVR.  She had self converted to sinus rhythm with sinus bradycardia.  The patient was taken to oral Toprol 25 mg half a dose in the morning and p.m.  Because of her atrial fibrillation she was started on amiodarone.  In follow-up the patient has been feeling well and does not describe any recurrence of A-fib.  She is currently on amiodarone 200 mg/day metoprolol 25 mg half tablet twice daily spironolactone and Xarelto  She had a monitor placed 2/17/2023 that showed predominantly sinus rhythm with short episodes of atrial tachycardia at 137 bpm.  EKG from [facility] from February 22, 2023 was reviewed that showed A-fib with RVR.  She had an echocardiogram performed at [facility] on 2/24/2023 that showed EF 65%, mild mitral regurgitation, mild tricuspid regurgitation, left atrial volume index 35 cc per metered square.  She had thyroid function test done on 2/10/2023 that showed normal TSH.  The patient had a prior evaluation 2/23/2023 for syncope episode.  She fainted right after exercise class in 2/2/2023.  She has a prior history of hypertension, COPD and paroxysmal atrial fibrillation.

## 2023-08-11 RX ORDER — AMIODARONE HYDROCHLORIDE 200 MG/1
200 TABLET ORAL
Qty: 30 | Refills: 1 | Status: DISCONTINUED | COMMUNITY
Start: 2023-02-25 | End: 2023-08-11

## 2023-09-07 ENCOUNTER — APPOINTMENT (OUTPATIENT)
Dept: OPHTHALMOLOGY | Facility: CLINIC | Age: 88
End: 2023-09-07
Payer: MEDICARE

## 2023-09-07 ENCOUNTER — NON-APPOINTMENT (OUTPATIENT)
Age: 88
End: 2023-09-07

## 2023-09-07 PROCEDURE — 92133 CPTRZD OPH DX IMG PST SGM ON: CPT

## 2023-09-07 PROCEDURE — 92083 EXTENDED VISUAL FIELD XM: CPT

## 2023-10-16 ENCOUNTER — APPOINTMENT (OUTPATIENT)
Dept: DERMATOLOGY | Facility: CLINIC | Age: 88
End: 2023-10-16

## 2023-11-02 ENCOUNTER — NON-APPOINTMENT (OUTPATIENT)
Age: 88
End: 2023-11-02

## 2023-11-02 ENCOUNTER — APPOINTMENT (OUTPATIENT)
Dept: OPHTHALMOLOGY | Facility: CLINIC | Age: 88
End: 2023-11-02
Payer: MEDICARE

## 2023-11-02 PROCEDURE — 92250 FUNDUS PHOTOGRAPHY W/I&R: CPT

## 2023-11-02 PROCEDURE — 92014 COMPRE OPH EXAM EST PT 1/>: CPT

## 2024-01-12 ENCOUNTER — APPOINTMENT (OUTPATIENT)
Dept: ULTRASOUND IMAGING | Facility: CLINIC | Age: 89
End: 2024-01-12
Payer: MEDICARE

## 2024-01-12 PROCEDURE — 93925 LOWER EXTREMITY STUDY: CPT

## 2024-01-24 ENCOUNTER — APPOINTMENT (OUTPATIENT)
Dept: RADIOLOGY | Facility: CLINIC | Age: 89
End: 2024-01-24
Payer: MEDICARE

## 2024-01-24 PROCEDURE — 71046 X-RAY EXAM CHEST 2 VIEWS: CPT

## 2024-01-25 ENCOUNTER — APPOINTMENT (OUTPATIENT)
Dept: VASCULAR SURGERY | Facility: CLINIC | Age: 89
End: 2024-01-25
Payer: MEDICARE

## 2024-01-25 VITALS
DIASTOLIC BLOOD PRESSURE: 72 MMHG | SYSTOLIC BLOOD PRESSURE: 122 MMHG | WEIGHT: 105 LBS | BODY MASS INDEX: 19.32 KG/M2 | HEIGHT: 62 IN | HEART RATE: 69 BPM | OXYGEN SATURATION: 99 % | RESPIRATION RATE: 14 BRPM | TEMPERATURE: 97.2 F

## 2024-01-25 DIAGNOSIS — R60.0 LOCALIZED EDEMA: ICD-10-CM

## 2024-01-25 DIAGNOSIS — I73.9 PERIPHERAL VASCULAR DISEASE, UNSPECIFIED: ICD-10-CM

## 2024-01-25 PROCEDURE — 93923 UPR/LXTR ART STDY 3+ LVLS: CPT

## 2024-01-25 PROCEDURE — 99203 OFFICE O/P NEW LOW 30 MIN: CPT

## 2024-01-25 RX ORDER — PANTOPRAZOLE 40 MG/1
40 TABLET, DELAYED RELEASE ORAL
Refills: 0 | Status: DISCONTINUED | COMMUNITY
End: 2024-01-25

## 2024-02-09 RX ORDER — AMIODARONE HYDROCHLORIDE 100 MG/1
100 TABLET ORAL DAILY
Qty: 90 | Refills: 0 | Status: ACTIVE | COMMUNITY
Start: 2023-08-11 | End: 1900-01-01

## 2024-03-21 ENCOUNTER — NON-APPOINTMENT (OUTPATIENT)
Age: 89
End: 2024-03-21

## 2024-03-21 ENCOUNTER — APPOINTMENT (OUTPATIENT)
Dept: OPHTHALMOLOGY | Facility: CLINIC | Age: 89
End: 2024-03-21
Payer: MEDICARE

## 2024-03-21 PROCEDURE — 92134 CPTRZ OPH DX IMG PST SGM RTA: CPT

## 2024-03-21 PROCEDURE — 92014 COMPRE OPH EXAM EST PT 1/>: CPT

## 2024-04-12 ENCOUNTER — APPOINTMENT (OUTPATIENT)
Dept: VASCULAR SURGERY | Facility: CLINIC | Age: 89
End: 2024-04-12

## 2024-08-22 ENCOUNTER — NON-APPOINTMENT (OUTPATIENT)
Age: 89
End: 2024-08-22

## 2024-08-22 ENCOUNTER — APPOINTMENT (OUTPATIENT)
Dept: OPHTHALMOLOGY | Facility: CLINIC | Age: 89
End: 2024-08-22
Payer: MEDICARE

## 2024-08-22 PROCEDURE — 99213 OFFICE O/P EST LOW 20 MIN: CPT

## 2024-08-22 PROCEDURE — 92134 CPTRZ OPH DX IMG PST SGM RTA: CPT

## 2024-09-11 ENCOUNTER — NON-APPOINTMENT (OUTPATIENT)
Age: 89
End: 2024-09-11

## 2024-09-11 ENCOUNTER — APPOINTMENT (OUTPATIENT)
Dept: ELECTROPHYSIOLOGY | Facility: CLINIC | Age: 89
End: 2024-09-11

## 2024-09-11 VITALS
HEART RATE: 63 BPM | BODY MASS INDEX: 19.14 KG/M2 | OXYGEN SATURATION: 96 % | HEIGHT: 62 IN | SYSTOLIC BLOOD PRESSURE: 146 MMHG | DIASTOLIC BLOOD PRESSURE: 70 MMHG | WEIGHT: 104 LBS

## 2024-09-11 PROCEDURE — 93000 ELECTROCARDIOGRAM COMPLETE: CPT

## 2024-09-11 PROCEDURE — 99213 OFFICE O/P EST LOW 20 MIN: CPT

## 2024-09-11 NOTE — CARDIOLOGY SUMMARY
[de-identified] : Sinus Rhythm  -Intraventricular conduction defect and left axis -possible anterior fascicular block  consider ventricular hypertrophy.

## 2024-09-11 NOTE — DISCUSSION/SUMMARY
[FreeTextEntry1] : She seems to be doing well without any recurrence of A-fib.  She does have recent emergence of very transient dizziness.  She has baseline COPD and cough.  I would advise her to follow-up with her pulmonologist regarding this and to obtain pulmonary function testing as well.  We will get follow-up liver function as well as thyroid function testing.  We will also get a follow-up monitor to see if these dizzy episodes correspond to any bradycardia.  For now we will continue her on amiodarone 100 mg/day and Xarelto.  She is also on metoprolol 25 mg/day half a tablet daily which we will continue.  The patient is seeing her cardiologist Dr. Bryan Solis shortly and she will get follow-up testing including a monitor with him.  I will forward a copy of my note to him.  The

## 2024-09-11 NOTE — HISTORY OF PRESENT ILLNESS
[FreeTextEntry1] : Cardiologist:  Bryan Spence MD Has cough - o/w feeling well Patient is an 87-year-old woman who is seen in follow-up evaluation regarding her prior atrial fibrillation.  As noted previously she was seen at [facility] back in March 2023 for A-fib and she was started on amiodarone.  She was seen in the office March 2023 at which time her amiodarone dosage was decreased from 200 mg a day to 100 mg/day.  Since then the patient has been feeling well without any recurrence of atrial fibrillation.  She has 200 mg pills that she pranks.  She seems to be tolerating amiodarone well.  She has had some recent intermittent dizziness that she does not consider serious.  No chest pain or shortness of breath.  No palpitations.  She has an occasional cough which she has had for a long time.  She does not sleep very well at this time.  She has a prior history of hypertension, COPD.  She was diagnosed with atrial fibrillation about 10 years ago.  Previous history: The patient had presented to [facility] emergency room with complaints of fatigue and shortness of breath and was found to be in A-fib with RVR.  She had self converted to sinus rhythm with sinus bradycardia.  The patient was taken to oral Toprol 25 mg half a dose in the morning and p.m.  Because of her atrial fibrillation she was started on amiodarone.  In follow-up the patient has been feeling well and does not describe any recurrence of A-fib.  She is currently on amiodarone 200 mg/day metoprolol 25 mg half tablet twice daily spironolactone and Xarelto  She had a monitor placed 2/17/2023 that showed predominantly sinus rhythm with short episodes of atrial tachycardia at 137 bpm.  EKG from [facility] from February 22, 2023 was reviewed that showed A-fib with RVR.  She had an echocardiogram performed at [facility] on 2/24/2023 that showed EF 65%, mild mitral regurgitation, mild tricuspid regurgitation, left atrial volume index 35 cc per metered square.  She had thyroid function test done on 2/10/2023 that showed normal TSH.  The patient had a prior evaluation 2/23/2023 for syncope episode.  She fainted right after exercise class in 2/2/2023.  She has a prior history of hypertension, COPD and paroxysmal atrial fibrillation.

## 2025-02-11 ENCOUNTER — APPOINTMENT (OUTPATIENT)
Dept: MRI IMAGING | Facility: CLINIC | Age: 89
End: 2025-02-11
Payer: MEDICARE

## 2025-02-11 PROCEDURE — 73721 MRI JNT OF LWR EXTRE W/O DYE: CPT | Mod: LT

## 2025-02-12 ENCOUNTER — TRANSCRIPTION ENCOUNTER (OUTPATIENT)
Age: 89
End: 2025-02-12

## 2025-02-27 ENCOUNTER — NON-APPOINTMENT (OUTPATIENT)
Age: 89
End: 2025-02-27

## 2025-02-27 ENCOUNTER — APPOINTMENT (OUTPATIENT)
Dept: OPHTHALMOLOGY | Facility: CLINIC | Age: 89
End: 2025-02-27
Payer: MEDICARE

## 2025-02-27 PROCEDURE — 99213 OFFICE O/P EST LOW 20 MIN: CPT

## 2025-02-27 PROCEDURE — 92250 FUNDUS PHOTOGRAPHY W/I&R: CPT

## 2025-04-09 ENCOUNTER — APPOINTMENT (OUTPATIENT)
Dept: CT IMAGING | Facility: CLINIC | Age: 89
End: 2025-04-09
Payer: MEDICARE

## 2025-04-09 PROCEDURE — 71250 CT THORAX DX C-: CPT

## 2025-06-30 ENCOUNTER — TRANSCRIPTION ENCOUNTER (OUTPATIENT)
Age: 89
End: 2025-06-30

## 2025-06-30 ENCOUNTER — RESULT REVIEW (OUTPATIENT)
Age: 89
End: 2025-06-30